# Patient Record
Sex: MALE | Employment: STUDENT | URBAN - METROPOLITAN AREA
[De-identification: names, ages, dates, MRNs, and addresses within clinical notes are randomized per-mention and may not be internally consistent; named-entity substitution may affect disease eponyms.]

---
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
## 2024-05-15 ASSESSMENT — PATIENT HEALTH QUESTIONNAIRE - PHQ9
SUM OF ALL RESPONSES TO PHQ QUESTIONS 1-9: 0
1. LITTLE INTEREST OR PLEASURE IN DOING THINGS: NOT AT ALL
SUM OF ALL RESPONSES TO PHQ QUESTIONS 1-9: 0
2. FEELING DOWN, DEPRESSED OR HOPELESS: NOT AT ALL
SUM OF ALL RESPONSES TO PHQ9 QUESTIONS 1 & 2: 0
SUM OF ALL RESPONSES TO PHQ9 QUESTIONS 1 & 2: 0
SUM OF ALL RESPONSES TO PHQ QUESTIONS 1-9: 0
2. FEELING DOWN, DEPRESSED OR HOPELESS: NOT AT ALL
1. LITTLE INTEREST OR PLEASURE IN DOING THINGS: NOT AT ALL
SUM OF ALL RESPONSES TO PHQ QUESTIONS 1-9: 0

## 2024-05-16 ENCOUNTER — OFFICE VISIT (OUTPATIENT)
Dept: FAMILY MEDICINE CLINIC | Age: 19
End: 2024-05-16
Payer: COMMERCIAL

## 2024-05-16 VITALS
SYSTOLIC BLOOD PRESSURE: 110 MMHG | DIASTOLIC BLOOD PRESSURE: 68 MMHG | BODY MASS INDEX: 23.78 KG/M2 | OXYGEN SATURATION: 97 % | WEIGHT: 175.6 LBS | HEART RATE: 82 BPM | HEIGHT: 72 IN

## 2024-05-16 DIAGNOSIS — Z00.00 WELL ADULT EXAM: Primary | ICD-10-CM

## 2024-05-16 PROCEDURE — 99395 PREV VISIT EST AGE 18-39: CPT | Performed by: STUDENT IN AN ORGANIZED HEALTH CARE EDUCATION/TRAINING PROGRAM

## 2024-05-16 RX ORDER — SODIUM FLUORIDE 6 MG/ML
PASTE, DENTIFRICE DENTAL
COMMUNITY
Start: 2024-05-10

## 2024-05-16 SDOH — ECONOMIC STABILITY: FOOD INSECURITY: WITHIN THE PAST 12 MONTHS, THE FOOD YOU BOUGHT JUST DIDN'T LAST AND YOU DIDN'T HAVE MONEY TO GET MORE.: NEVER TRUE

## 2024-05-16 SDOH — ECONOMIC STABILITY: INCOME INSECURITY: HOW HARD IS IT FOR YOU TO PAY FOR THE VERY BASICS LIKE FOOD, HOUSING, MEDICAL CARE, AND HEATING?: NOT HARD AT ALL

## 2024-05-16 SDOH — ECONOMIC STABILITY: HOUSING INSECURITY
IN THE LAST 12 MONTHS, WAS THERE A TIME WHEN YOU DID NOT HAVE A STEADY PLACE TO SLEEP OR SLEPT IN A SHELTER (INCLUDING NOW)?: NO

## 2024-05-16 SDOH — ECONOMIC STABILITY: FOOD INSECURITY: WITHIN THE PAST 12 MONTHS, YOU WORRIED THAT YOUR FOOD WOULD RUN OUT BEFORE YOU GOT MONEY TO BUY MORE.: NEVER TRUE

## 2024-05-16 NOTE — PROGRESS NOTES
Completed    HPV vaccine  Completed    Polio vaccine  Completed    Varicella vaccine  Completed    Meningococcal (ACWY) vaccine  Completed    Flu vaccine  Completed    COVID-19 Vaccine  Completed    Pneumococcal 0-64 years Vaccine  Aged Out    Measles,Mumps,Rubella (MMR) vaccine  Discontinued       PSH, PMH, SH and FH reviewed and noted.  Recent and past labs, tests and consults also reviewed.  Recent or new meds also reviewed.    Cresencio Moody MD    This dictation was generated by voice recognition computer software.  Although all attempts are made to edit the dictation for accuracy, there may be errors in the transcription that are not intended.

## 2025-05-15 ASSESSMENT — PATIENT HEALTH QUESTIONNAIRE - PHQ9
1. LITTLE INTEREST OR PLEASURE IN DOING THINGS: NOT AT ALL
SUM OF ALL RESPONSES TO PHQ QUESTIONS 1-9: 0
2. FEELING DOWN, DEPRESSED OR HOPELESS: NOT AT ALL
SUM OF ALL RESPONSES TO PHQ QUESTIONS 1-9: 0
2. FEELING DOWN, DEPRESSED OR HOPELESS: NOT AT ALL
SUM OF ALL RESPONSES TO PHQ QUESTIONS 1-9: 0
1. LITTLE INTEREST OR PLEASURE IN DOING THINGS: NOT AT ALL
SUM OF ALL RESPONSES TO PHQ QUESTIONS 1-9: 0
SUM OF ALL RESPONSES TO PHQ9 QUESTIONS 1 & 2: 0

## 2025-05-16 ENCOUNTER — OFFICE VISIT (OUTPATIENT)
Dept: FAMILY MEDICINE CLINIC | Age: 20
End: 2025-05-16
Payer: COMMERCIAL

## 2025-05-16 VITALS
HEART RATE: 74 BPM | SYSTOLIC BLOOD PRESSURE: 124 MMHG | OXYGEN SATURATION: 98 % | WEIGHT: 184.8 LBS | BODY MASS INDEX: 25.03 KG/M2 | DIASTOLIC BLOOD PRESSURE: 72 MMHG | HEIGHT: 72 IN

## 2025-05-16 DIAGNOSIS — Q67.6 PECTUS EXCAVATUM: ICD-10-CM

## 2025-05-16 DIAGNOSIS — F41.9 ANXIETY: ICD-10-CM

## 2025-05-16 DIAGNOSIS — Z00.00 WELL ADULT EXAM: Primary | ICD-10-CM

## 2025-05-16 DIAGNOSIS — F84.0 AUTISM: ICD-10-CM

## 2025-05-16 DIAGNOSIS — F42.8 OTHER OBSESSIVE-COMPULSIVE DISORDERS: ICD-10-CM

## 2025-05-16 PROCEDURE — 99395 PREV VISIT EST AGE 18-39: CPT | Performed by: STUDENT IN AN ORGANIZED HEALTH CARE EDUCATION/TRAINING PROGRAM

## 2025-05-16 SDOH — ECONOMIC STABILITY: FOOD INSECURITY: WITHIN THE PAST 12 MONTHS, THE FOOD YOU BOUGHT JUST DIDN'T LAST AND YOU DIDN'T HAVE MONEY TO GET MORE.: NEVER TRUE

## 2025-05-16 SDOH — ECONOMIC STABILITY: FOOD INSECURITY: WITHIN THE PAST 12 MONTHS, YOU WORRIED THAT YOUR FOOD WOULD RUN OUT BEFORE YOU GOT MONEY TO BUY MORE.: NEVER TRUE

## 2025-05-16 NOTE — PROGRESS NOTES
[facility]  Establish care visit   2025    Gino Grijalva (:  2005) is a 20 y.o. male, here to establish care.    Chief Complaint   Patient presents with    Annual Exam        ASSESSMENT/ PLAN  1. Well adult exam  General wellness exam. Reviewed chart for past hx and updated today. Counseled on age appropriate health guidance and discussed screening recommendations. Vaccinations reviewed and discussed. All questions answered.    2. Pectus excavatum  Chronic.  Stable.  Follows with [facility] cardiothoracic surgery.  No concerns at this time.    3. Autism  Chronic.  Stable.  Follows with psychiatry.    4. Anxiety  Chronic.  Stable.  Follows with psychiatry.  On Abilify and clomipramine.  No concerns at this time.    5. Other obsessive-compulsive disorders  Chronic.  Stable.  Follows with psychiatry.  On Abilify and clomipramine.  No concerns at this time.       No follow-ups on file.    HPI  Patient is a 20-year-old male, who presents to clinic for a well adult visit.  Patient does not have any allergies to any medications and he is on Abilify, clomipramine, metformin.  Patient does follows a gluten-free diet.  Patient does exercise regularly and often plays [employer] ball.  Patient is up-to-date on his childhood vaccines and boosters including his tetanus booster which does  shortly.  He does not use any tobacco products, does drink alcohol socially, does not use drugs.  He is not sexually active.  He does go to the dentist regularly, no longer has his wisdom teeth but does well of his tonsils.  He has never been told that he is a heart murmur or heart arrhythmia before.  He has never had any abdominal surgeries before.    Patient has a history of pectus excavated him.  Status post surgery.  Follows with [facility] cardiothoracic surgery annually.  Most recent visit was within the past week.    Patient also has autism with anxiety and OCD.  Patient follows with

## 2025-07-30 ENCOUNTER — OFFICE VISIT (OUTPATIENT)
Dept: FAMILY MEDICINE CLINIC | Facility: CLINIC | Age: 20
End: 2025-07-30
Payer: COMMERCIAL

## 2025-07-30 VITALS
BODY MASS INDEX: 24.02 KG/M2 | HEART RATE: 86 BPM | WEIGHT: 187.2 LBS | OXYGEN SATURATION: 98 % | TEMPERATURE: 97.2 F | HEIGHT: 74 IN | SYSTOLIC BLOOD PRESSURE: 135 MMHG | DIASTOLIC BLOOD PRESSURE: 79 MMHG

## 2025-07-30 DIAGNOSIS — Z13.0 SCREENING, ANEMIA, DEFICIENCY, IRON: ICD-10-CM

## 2025-07-30 DIAGNOSIS — Z13.6 SCREENING FOR CARDIOVASCULAR CONDITION: ICD-10-CM

## 2025-07-30 DIAGNOSIS — Z00.00 ROUTINE GENERAL MEDICAL EXAMINATION AT A HEALTH CARE FACILITY: Primary | ICD-10-CM

## 2025-07-30 LAB
ALBUMIN SERPL-MCNC: 4.6 G/DL (ref 3.5–5)
ALBUMIN/GLOB SERPL: 1.5 (ref 1–1.9)
ALP SERPL-CCNC: 50 U/L (ref 40–129)
ALT SERPL-CCNC: 31 U/L (ref 8–55)
ANION GAP SERPL CALC-SCNC: 13 MMOL/L (ref 7–16)
AST SERPL-CCNC: 21 U/L (ref 15–37)
BASOPHILS # BLD: 0.07 K/UL (ref 0–0.2)
BASOPHILS NFR BLD: 1.4 % (ref 0–2)
BILIRUB SERPL-MCNC: 0.6 MG/DL (ref 0–1.2)
BUN SERPL-MCNC: 11 MG/DL (ref 6–23)
CALCIUM SERPL-MCNC: 9.6 MG/DL (ref 8.8–10.2)
CHLORIDE SERPL-SCNC: 103 MMOL/L (ref 98–107)
CO2 SERPL-SCNC: 24 MMOL/L (ref 20–29)
CREAT SERPL-MCNC: 0.76 MG/DL (ref 0.8–1.3)
DIFFERENTIAL METHOD BLD: NORMAL
EOSINOPHIL # BLD: 0.09 K/UL (ref 0–0.8)
EOSINOPHIL NFR BLD: 1.8 % (ref 0.5–7.8)
ERYTHROCYTE [DISTWIDTH] IN BLOOD BY AUTOMATED COUNT: 12.3 % (ref 11.9–14.6)
GLOBULIN SER CALC-MCNC: 3.1 G/DL (ref 2.3–3.5)
GLUCOSE SERPL-MCNC: 94 MG/DL (ref 70–99)
HCT VFR BLD AUTO: 46.5 % (ref 41.1–50.3)
HGB BLD-MCNC: 16.1 G/DL (ref 13.6–17.2)
IMM GRANULOCYTES # BLD AUTO: 0.01 K/UL (ref 0–0.5)
IMM GRANULOCYTES NFR BLD AUTO: 0.2 % (ref 0–5)
LYMPHOCYTES # BLD: 1.9 K/UL (ref 0.5–4.6)
LYMPHOCYTES NFR BLD: 38.8 % (ref 13–44)
MCH RBC QN AUTO: 31.1 PG (ref 26.1–32.9)
MCHC RBC AUTO-ENTMCNC: 34.6 G/DL (ref 31.4–35)
MCV RBC AUTO: 89.9 FL (ref 82–102)
MONOCYTES # BLD: 0.5 K/UL (ref 0.1–1.3)
MONOCYTES NFR BLD: 10.2 % (ref 4–12)
NEUTS SEG # BLD: 2.33 K/UL (ref 1.7–8.2)
NEUTS SEG NFR BLD: 47.6 % (ref 43–78)
NRBC # BLD: 0 K/UL (ref 0–0.2)
PLATELET # BLD AUTO: 211 K/UL (ref 150–450)
PMV BLD AUTO: 10.9 FL (ref 9.4–12.3)
POTASSIUM SERPL-SCNC: 4.6 MMOL/L (ref 3.5–5.1)
PROT SERPL-MCNC: 7.6 G/DL (ref 6.3–8.2)
RBC # BLD AUTO: 5.17 M/UL (ref 4.23–5.6)
SODIUM SERPL-SCNC: 140 MMOL/L (ref 136–145)
WBC # BLD AUTO: 4.9 K/UL (ref 4.3–11.1)

## 2025-07-30 PROCEDURE — 99395 PREV VISIT EST AGE 18-39: CPT | Performed by: FAMILY MEDICINE

## 2025-07-30 SDOH — ECONOMIC STABILITY: FOOD INSECURITY: WITHIN THE PAST 12 MONTHS, YOU WORRIED THAT YOUR FOOD WOULD RUN OUT BEFORE YOU GOT MONEY TO BUY MORE.: NEVER TRUE

## 2025-07-30 SDOH — ECONOMIC STABILITY: FOOD INSECURITY: WITHIN THE PAST 12 MONTHS, THE FOOD YOU BOUGHT JUST DIDN'T LAST AND YOU DIDN'T HAVE MONEY TO GET MORE.: NEVER TRUE

## 2025-07-30 ASSESSMENT — PATIENT HEALTH QUESTIONNAIRE - PHQ9
SUM OF ALL RESPONSES TO PHQ QUESTIONS 1-9: 1
2. FEELING DOWN, DEPRESSED OR HOPELESS: SEVERAL DAYS
SUM OF ALL RESPONSES TO PHQ QUESTIONS 1-9: 1
1. LITTLE INTEREST OR PLEASURE IN DOING THINGS: NOT AT ALL

## 2025-07-30 NOTE — PROGRESS NOTES
[facility] [address] [address]  Phone: [phone] Fax [phone]  Maya Shukla MD             Mr. Gasca is a 20 y.o.  y.o.  year old  male patient who comes in for complete physical. He has been doing well.    History of Present Illness  The patient is a 20-year-old male who presents for a physical exam.    He reports no current health issues and maintains an active lifestyle, including regular cycling. His diet is generally balanced, although he does not consume vegetables daily. [place] which provides him with access to a dining tiwari. He believes he has received the meningitis vaccine in the past and typically gets the influenza vaccine annually.        Mr. Gasca  has no past medical history on file.    Mr. Gasca  has a past surgical history that includes Tympanostomy tube placement.    Mr. Gasca   No current outpatient medications on file.     No current facility-administered medications for this visit.       Mr. Gasca  reports that he has never smoked. He has never used smokeless tobacco. He reports current alcohol use of about 1.0 standard drink of alcohol per week. He reports that he does not use drugs.    Mr. Gasca family history includes Diabetes in his father and mother; Elevated Lipids in his father and mother.        No Known Allergies      /79   Pulse 86   Temp 97.2 °F (36.2 °C) (Temporal)   Ht 1.88 m (6' 2\")   Wt 84.9 kg (187 lb 3.2 oz)   SpO2 98%   BMI 24.04 kg/m²       General: alert, oriented x 3, pleasant.  HEENT: Normocephalic, atraumatic, pupils equal and reactive to light. Tympanic membranes intact without erythema or edema. Oropharynx clear.   Neck: Supple, no masses or thyromegaly or lymphadenopathy.  Lungs: clear to auscultation bilaterally without wheezing or rhonchi.  CV: regular rate and rhythm, without murmurs, rubs, or gallops.  Abdomen: soft, nontender, nondistended, no masses. No hepatosplenomegaly.